# Patient Record
Sex: MALE | Race: WHITE | NOT HISPANIC OR LATINO | Employment: FULL TIME | URBAN - METROPOLITAN AREA
[De-identification: names, ages, dates, MRNs, and addresses within clinical notes are randomized per-mention and may not be internally consistent; named-entity substitution may affect disease eponyms.]

---

## 2018-10-29 ENCOUNTER — OFFICE VISIT (OUTPATIENT)
Dept: NEUROSURGERY | Facility: CLINIC | Age: 48
End: 2018-10-29

## 2018-10-29 VITALS
WEIGHT: 254 LBS | TEMPERATURE: 97.7 F | HEIGHT: 72 IN | SYSTOLIC BLOOD PRESSURE: 118 MMHG | DIASTOLIC BLOOD PRESSURE: 64 MMHG | BODY MASS INDEX: 34.4 KG/M2

## 2018-10-29 DIAGNOSIS — G56.21 ENTRAPMENT OF RIGHT ULNAR NERVE: ICD-10-CM

## 2018-10-29 DIAGNOSIS — M48.02 SPINAL STENOSIS IN CERVICAL REGION: Primary | ICD-10-CM

## 2018-10-29 DIAGNOSIS — M50.30 DDD (DEGENERATIVE DISC DISEASE), CERVICAL: ICD-10-CM

## 2018-10-29 DIAGNOSIS — G90.513 COMPLEX REGIONAL PAIN SYNDROME TYPE 1 OF BOTH UPPER EXTREMITIES: ICD-10-CM

## 2018-10-29 DIAGNOSIS — Z86.69 HISTORY OF CHIARI MALFORMATION: ICD-10-CM

## 2018-10-29 DIAGNOSIS — G56.01 RIGHT CARPAL TUNNEL SYNDROME: ICD-10-CM

## 2018-10-29 DIAGNOSIS — M96.1 CERVICAL POST-LAMINECTOMY SYNDROME: ICD-10-CM

## 2018-10-29 DIAGNOSIS — M79.672 FOOT PAIN, BILATERAL: ICD-10-CM

## 2018-10-29 DIAGNOSIS — M79.671 FOOT PAIN, BILATERAL: ICD-10-CM

## 2018-10-29 PROCEDURE — 99213 OFFICE O/P EST LOW 20 MIN: CPT | Performed by: PHYSICIAN ASSISTANT

## 2018-10-29 RX ORDER — GABAPENTIN 800 MG/1
800 TABLET ORAL
COMMUNITY
End: 2019-12-23

## 2018-10-29 RX ORDER — OXYCODONE AND ACETAMINOPHEN 10; 325 MG/1; MG/1
TABLET ORAL
COMMUNITY
End: 2019-12-23

## 2018-10-29 NOTE — PROGRESS NOTES
"Subjective     Chief Complaint:  Edil Ireland is a 48 y.o. male here today with complaints of bilateral arm pain that is new and worsening as well as bilateral foot pain.    History of Present Illness  Mr Ireland is a 49yo M with a complicated surgical history. He has a chiari malformation that is s/p remote craniovertebral decompression done by Dr. Romeo. He has had cervical laminectomy for stenosis, insertion and removal of spinal cord stimulator, and right ulnar nerve and median nerve decompression done by Dr. Rendon. He has bilateral complex regional pain syndrome in his arms and hands, with sensitivity to wind or clothing, with intermittent redness and sweating. This has been present for many years, but he notes that it has become much worse over the last few months.  He states that the \"stinging\" pain and sensitivity runs across his biceps diagonally, down the dorsal aspect of his forearm and into his hands. It is equally severe bilaterally and in the same distribution. At his last visit with Dr. Rendon in May of 2016, MRI of the cervical spine showed some moderate stenosis at C5/6 and C6/7, but EMG/NCV from 05/04/2016 showed  ulnar nerve entrapment at the elbow and median neuropathy at the wrist on the right, consistent with carpal tunnel syndrome. He underwent CTR and ulnar nerve release on the right on 8/5/16. The patient notes that this did not help at all and that he was no better and no worse afterward.     In addition to his arm and hand pain, he also notes bilateral foot pain and numbness of the big toes. He states that the bottom of his feet bilaterally have pain and feel \"like a stone bruise\". He was told that he has plantar fascitis, and he has switched shoes, but has had no relief yet. He denies thigh or calf pain, weakness, or gait disturbance. He denies bowel or bladder dysfunction. He also denies neck or back pain. He has not had lumbar surgery or injury in the past.  His pain is primarily " "arm/hand and feet. He has not had any new MRI since 2016. He states that he has had a lower extremity EMG done at his pain management doctor's, but does not have that report with him. He takes gabapentin 800mg TID and Percocet 10 QID at baseline for chronic pain, but does not feel that these doses are helping his current symptoms. He works full time as a manager, with a great deal of walking, typing, and writing.        The following portions of the patient's history were reviewed and updated as appropriate: allergies, current medications, past family history, past medical history, past social history, past surgical history and problem list.      Past Medical History:   Diagnosis Date   • Arm pain, left    • Full dentures    • Neuropathic pain    • Wears glasses      Past Surgical History:   Procedure Laterality Date   • CARPAL TUNNEL RELEASE Right 8/5/2016    Procedure: CARPAL TUNNEL RELEASE;  Surgeon: Benito Rendon MD;  Location:  [facility];  Service:    • NECK SURGERY     • SPINAL CORD STIMULATOR REMOVAL     • ULNAR NERVE DECOMPRESSION Right 8/5/2016    Procedure: ULNAR NERVE RELEASE AT ELBOW;  Surgeon: Benito Rendon MD;  Location:  [facility];  Service:      Family history:   Family History   Problem Relation Age of Onset   • Hypertension Father        Social history:   Social History     Social History   • Marital status:      Spouse name: N/A   • Number of children: N/A   • Years of education: N/A     Occupational History   • Not on file.     Social History Main Topics   • Smoking status: Current Every Day Smoker     Packs/day: 2.00     Years: 20.00   • Smokeless tobacco: Current User     Types: Chew      Comment: \"CHEW AT WORK ONLY WHEN I CAN'T SMOKE\"   • Alcohol use No   • Drug use: No   • Sexual activity: Not on file     Other Topics Concern   • Not on file     Social History Narrative   • No narrative on file       Review of Systems   Constitutional: Negative for diaphoresis, fatigue and fever. " "  Respiratory: Negative for shortness of breath.    Cardiovascular: Negative for chest pain and palpitations.   Gastrointestinal: Negative for abdominal pain, nausea and vomiting.   Musculoskeletal: Positive for back pain and neck pain.   Neurological: Positive for weakness. Negative for dizziness, syncope, light-headedness and headaches.   Psychiatric/Behavioral: Negative for confusion.   All other systems reviewed and are negative.      Objective   Blood pressure 118/64, temperature 97.7 °F (36.5 °C), temperature source Temporal Artery , height 182.9 cm (72\"), weight 115 kg (254 lb).  Body mass index is 34.45 kg/m².    Physical Exam   Constitutional: He is oriented to person, place, and time. He appears well-developed and well-nourished. No distress.   Mild obesity   HENT:   Right Ear: External ear normal.   Left Ear: External ear normal.   Posterior cervical and occipital incision, well-healed   Eyes: Pupils are equal, round, and reactive to light. EOM are normal.   Neck: Normal range of motion.   Pulmonary/Chest: Effort normal. No respiratory distress.   Musculoskeletal:   Gait is antalgic and favors the left leg. It is somewhat wide-based, but without spasticity or discoordination.    LE strength 5/5 and equal to direct testing.         Neurological: He is alert and oriented to person, place, and time. No cranial nerve deficit. Coordination normal.   Reflex Scores:       Tricep reflexes are 1+ on the right side and 1+ on the left side.       Bicep reflexes are 1+ on the right side and 1+ on the left side.       Brachioradialis reflexes are 0 on the right side and 0 on the left side.       Patellar reflexes are 2+ on the right side and 2+ on the left side.  No carson's or clonus noted.    Skin: He is not diaphoretic.       Assessment/Plan   Mr. Ireland has a history of cervical stenosis, per MRI from both 2015 and 2016. Dr. Rendon did not feel at the time that it was severe enough to warrant intervention. We will " order a new cervical MRI for review. If this shows no progression of stenosis, we may attempt conservative treatment with PT and pain management.  He will follow up once the MRI has been completed.     Edil was seen today for neck pain.    Diagnoses and all orders for this visit:    Spinal stenosis in cervical region  -     MRI Cervical Spine With & Without Contrast; Future    History of Chiari malformation    Cervical post-laminectomy syndrome  -     MRI Cervical Spine With & Without Contrast; Future    DDD (degenerative disc disease), cervical  -     MRI Cervical Spine With & Without Contrast; Future    Foot pain, bilateral    Complex regional pain syndrome type 1 of both upper extremities    Entrapment of right ulnar nerve    Right carpal tunnel syndrome        Return in about 2 weeks (around 11/12/2018).

## 2018-11-12 ENCOUNTER — HOSPITAL ENCOUNTER (OUTPATIENT)
Dept: MRI IMAGING | Facility: HOSPITAL | Age: 48
Discharge: HOME | End: 2018-11-12
Admitting: PHYSICIAN ASSISTANT

## 2018-11-12 ENCOUNTER — APPOINTMENT (OUTPATIENT)
Dept: MRI IMAGING | Facility: HOSPITAL | Age: 48
End: 2018-11-12

## 2018-11-12 ENCOUNTER — OFFICE VISIT (OUTPATIENT)
Dept: NEUROSURGERY | Facility: CLINIC | Age: 48
End: 2018-11-12

## 2018-11-12 VITALS
TEMPERATURE: 97.4 F | SYSTOLIC BLOOD PRESSURE: 138 MMHG | WEIGHT: 253 LBS | HEIGHT: 72 IN | BODY MASS INDEX: 34.27 KG/M2 | DIASTOLIC BLOOD PRESSURE: 90 MMHG

## 2018-11-12 DIAGNOSIS — M48.02 SPINAL STENOSIS IN CERVICAL REGION: ICD-10-CM

## 2018-11-12 DIAGNOSIS — G90.513 COMPLEX REGIONAL PAIN SYNDROME TYPE 1 OF BOTH UPPER EXTREMITIES: ICD-10-CM

## 2018-11-12 DIAGNOSIS — M96.1 CERVICAL POST-LAMINECTOMY SYNDROME: ICD-10-CM

## 2018-11-12 DIAGNOSIS — M50.30 DDD (DEGENERATIVE DISC DISEASE), CERVICAL: ICD-10-CM

## 2018-11-12 DIAGNOSIS — M96.1 CERVICAL POST-LAMINECTOMY SYNDROME: Primary | ICD-10-CM

## 2018-11-12 PROCEDURE — A9577 INJ MULTIHANCE: HCPCS | Performed by: PHYSICIAN ASSISTANT

## 2018-11-12 PROCEDURE — 0 GADOBENATE DIMEGLUMINE 529 MG/ML SOLUTION: Performed by: PHYSICIAN ASSISTANT

## 2018-11-12 PROCEDURE — 72156 MRI NECK SPINE W/O & W/DYE: CPT

## 2018-11-12 PROCEDURE — 99213 OFFICE O/P EST LOW 20 MIN: CPT | Performed by: NEUROLOGICAL SURGERY

## 2018-11-12 RX ADMIN — GADOBENATE DIMEGLUMINE 20 ML: 529 INJECTION, SOLUTION INTRAVENOUS at 08:58

## 2018-11-12 NOTE — PROGRESS NOTES
"Subjective     Chief Complaint: Left arm neuropathic pain    Patient ID: Edil Ireland is a 48 y.o. male is here today for follow-up.    History of Present Illness    This is a 48-year-old man with a complicated medical history.  He has undergone a Chiari decompression with Dr. Romeo at the [facility].  He has undergone a cervical laminectomy.  He had a spinal cord stimulator which was implanted and subsequently removed.  He has complex regional pain syndrome of his bilateral upper extremities.  He presents today to discuss the results of her recent MRI of the cervical spine that was performed for worsening neuropathic pain of his left upper extremity.    The following portions of the patient's history were reviewed and updated as appropriate: allergies, current medications, past family history, past medical history, past social history, past surgical history and problem list.    Family history:   Family History   Problem Relation Age of Onset   • Hypertension Father        Social history:   Social History     Socioeconomic History   • Marital status:      Spouse name: Not on file   • Number of children: Not on file   • Years of education: Not on file   • Highest education level: Not on file   Social Needs   • Financial resource strain: Not on file   • Food insecurity - worry: Not on file   • Food insecurity - inability: Not on file   • Transportation needs - medical: Not on file   • Transportation needs - non-medical: Not on file   Occupational History   • Not on file   Tobacco Use   • Smoking status: Current Every Day Smoker     Packs/day: 2.00     Years: 20.00     Pack years: 40.00   • Smokeless tobacco: Current User     Types: Chew   • Tobacco comment: \"CHEW AT WORK ONLY WHEN I CAN'T SMOKE\"   Substance and Sexual Activity   • Alcohol use: No   • Drug use: No   • Sexual activity: Not on file   Other Topics Concern   • Not on file   Social History Narrative   • Not on file       Review of " Systems   Constitutional: Negative for activity change, appetite change, chills, diaphoresis, fatigue, fever and unexpected weight change.   HENT: Negative for congestion, dental problem, drooling, ear discharge, ear pain, facial swelling, hearing loss, mouth sores, nosebleeds, postnasal drip, rhinorrhea, sinus pressure, sneezing, sore throat, tinnitus, trouble swallowing and voice change.    Eyes: Negative for photophobia, pain, discharge, redness, itching and visual disturbance.   Respiratory: Negative for apnea, cough, choking, chest tightness, shortness of breath, wheezing and stridor.    Cardiovascular: Negative for chest pain, palpitations and leg swelling.   Gastrointestinal: Negative for abdominal distention, abdominal pain, anal bleeding, blood in stool, constipation, diarrhea, nausea, rectal pain and vomiting.   Endocrine: Negative for cold intolerance, heat intolerance, polydipsia, polyphagia and polyuria.   Genitourinary: Negative for decreased urine volume, difficulty urinating, dysuria, enuresis, flank pain, frequency, genital sores, hematuria and urgency.   Musculoskeletal: Positive for back pain and neck pain. Negative for arthralgias, gait problem, joint swelling, myalgias and neck stiffness.   Skin: Negative for color change, pallor, rash and wound.   Allergic/Immunologic: Negative for environmental allergies, food allergies and immunocompromised state.   Neurological: Positive for weakness. Negative for dizziness, tremors, seizures, syncope, facial asymmetry, speech difficulty, light-headedness, numbness and headaches.   Hematological: Negative for adenopathy. Does not bruise/bleed easily.   Psychiatric/Behavioral: Negative for agitation, behavioral problems, confusion, decreased concentration, dysphoric mood, hallucinations, self-injury, sleep disturbance and suicidal ideas. The patient is not nervous/anxious and is not hyperactive.    All other systems reviewed and are negative.      Objective  "  Blood pressure 138/90, temperature 97.4 °F (36.3 °C), temperature source Temporal, height 182.9 cm (72.01\"), weight 115 kg (253 lb).  Body mass index is 34.31 kg/m².    Physical Exam   Constitutional: He is oriented to person, place, and time.   Chronically ill-appearing   HENT:   Head: Normocephalic.   Pulmonary/Chest: Effort normal.   Neurological: He is alert and oriented to person, place, and time.   Psychiatric: He has a normal mood and affect.         Assessment/Plan     Independent Review of Radiographic Studies:      Available for my review is a MRI of the cervical spine that was performed on 11/12/18.  This demonstrates a large herniated disc at C6 7 which is eccentric to the right side and probably contacting the exiting C7 nerve root on the right.  This is only slightly worsened in comparison to his MRI of the cervical spine from 2016.    Medical Decision Making:      This is a 48-year-old man with chief complaints of left upper extremity neuropathic pain.  He does not have a clear MRI correlate for his symptoms.  He is not a candidate for an ACDF.  He may possibly require some more exotic cervical spinal cord pain surgery.  I've referred him to the pain neurosurgeons at [place].  I be happy to follow up with him on an as-needed basis.  Unfortunately, I don't think I have anything else to offer this complicated patient.    Edil was seen today for neck pain.    Diagnoses and all orders for this visit:    Cervical post-laminectomy syndrome    Complex regional pain syndrome type 1 of both upper extremities        Return if symptoms worsen or fail to improve.           This document signed by AYSE Cristina MD November 12, 2018 10:48 AM      "

## 2019-03-01 ENCOUNTER — TELEPHONE (OUTPATIENT)
Dept: NEUROSURGERY | Facility: CLINIC | Age: 49
End: 2019-03-01

## 2019-03-01 DIAGNOSIS — G56.01 RIGHT CARPAL TUNNEL SYNDROME: ICD-10-CM

## 2019-03-01 DIAGNOSIS — M50.30 DDD (DEGENERATIVE DISC DISEASE), CERVICAL: ICD-10-CM

## 2019-03-01 DIAGNOSIS — Z86.69 HISTORY OF CHIARI MALFORMATION: Primary | ICD-10-CM

## 2019-03-01 DIAGNOSIS — G90.513 COMPLEX REGIONAL PAIN SYNDROME TYPE 1 OF BOTH UPPER EXTREMITIES: ICD-10-CM

## 2019-03-01 DIAGNOSIS — M48.02 SPINAL STENOSIS IN CERVICAL REGION: ICD-10-CM

## 2019-03-01 NOTE — TELEPHONE ENCOUNTER
Provider:  Jonnie  Caller: Edil    Phone #:  [phone]  Last visit:   11/12/18  Next visit: n/a    GRANT:         Reason for call:           Pt is requesting to move forward with referral to [place]. Please adv

## 2019-03-04 NOTE — TELEPHONE ENCOUNTER
REF pended.     Unable to select a provider from [facility],   Contact info   [facility]  [facility]  [address]  [address]  [place] [address]  Phone [phone]

## 2019-03-13 NOTE — TELEPHONE ENCOUNTER
Spoke w/ Nusrat Arce does not expect pt's insurance We will start a REF for [facility].

## 2019-12-23 ENCOUNTER — OFFICE VISIT (OUTPATIENT)
Dept: NEUROSURGERY | Facility: CLINIC | Age: 49
End: 2019-12-23

## 2019-12-23 VITALS — WEIGHT: 261 LBS | BODY MASS INDEX: 35.35 KG/M2 | HEIGHT: 72 IN

## 2019-12-23 DIAGNOSIS — G89.29 CHRONIC NECK PAIN: ICD-10-CM

## 2019-12-23 DIAGNOSIS — G90.513 COMPLEX REGIONAL PAIN SYNDROME TYPE 1 OF BOTH UPPER EXTREMITIES: ICD-10-CM

## 2019-12-23 DIAGNOSIS — M54.2 CHRONIC NECK PAIN: ICD-10-CM

## 2019-12-23 DIAGNOSIS — M50.30 DDD (DEGENERATIVE DISC DISEASE), CERVICAL: Primary | ICD-10-CM

## 2019-12-23 PROCEDURE — 99213 OFFICE O/P EST LOW 20 MIN: CPT | Performed by: NEUROLOGICAL SURGERY

## 2019-12-23 RX ORDER — PREGABALIN 200 MG/1
200 CAPSULE ORAL
COMMUNITY
Start: 2019-04-01

## 2019-12-23 RX ORDER — OXYCODONE AND ACETAMINOPHEN 10; 325 MG/1; MG/1
1 TABLET ORAL EVERY 6 HOURS PRN
COMMUNITY

## 2019-12-23 NOTE — PROGRESS NOTES
"Subjective     Chief Complaint: Follow-up left arm pain, post laminectomy complex regional pain syndrome    Patient ID: Edil Ireland is a 49 y.o. male is here today for follow-up.    History of Present Illness    This is a 49-year-old man who I seen in the past for severe left arm pain.  He carries a diagnosis of complex regional pain syndrome.  He has previously had a cervical spinal cord stimulator placed and subsequently removed for lack of efficacy.  He presents today to see if there are any new treatments which might offer him some relief from his debilitating left arm pain.    He currently takes Lyrica 200 mg twice daily and states that this does provide adequate pain relief.  He has requested with his pain management doctor to increase the dose of Lyrica and so far has not been able to get his doctor to do this for him.  I previously have referred him to [place] pain clinic, however his insurance would not cover this visit.  He then went to the [facility] and saw a general practice neurosurgeon who offered to do an ACDF.  The patient fortunately declined to undergo this unindicated surgical procedure.    The following portions of the patient's history were reviewed and updated as appropriate: allergies, current medications, past family history, past medical history, past social history, past surgical history and problem list.    Family history:   Family History   Problem Relation Age of Onset   • Hypertension Father    • Cancer Father        Social history:   Social History     Socioeconomic History   • Marital status:      Spouse name: Not on file   • Number of children: Not on file   • Years of education: Not on file   • Highest education level: Not on file   Tobacco Use   • Smoking status: Current Every Day Smoker     Packs/day: 2.00     Years: 20.00     Pack years: 40.00   • Smokeless tobacco: Current User     Types: Chew   • Tobacco comment: \"CHEW AT WORK ONLY WHEN I CAN'T SMOKE\" "   Substance and Sexual Activity   • Alcohol use: No   • Drug use: No       Review of Systems   Constitutional: Negative for activity change, appetite change, chills, diaphoresis, fatigue, fever and unexpected weight change.   HENT: Positive for congestion. Negative for dental problem, drooling, ear discharge, ear pain, facial swelling, hearing loss, mouth sores, nosebleeds, postnasal drip, rhinorrhea, sinus pressure, sinus pain, sneezing, sore throat, tinnitus, trouble swallowing and voice change.    Eyes: Negative for photophobia, pain, discharge, redness, itching and visual disturbance.   Respiratory: Negative for apnea, cough, choking, chest tightness, shortness of breath, wheezing and stridor.    Cardiovascular: Negative for chest pain, palpitations and leg swelling.   Gastrointestinal: Negative for abdominal distention, abdominal pain, anal bleeding, blood in stool, constipation, diarrhea, nausea, rectal pain and vomiting.   Endocrine: Positive for cold intolerance and heat intolerance. Negative for polydipsia, polyphagia and polyuria.   Genitourinary: Negative for decreased urine volume, difficulty urinating, discharge, dysuria, enuresis, flank pain, frequency, genital sores, hematuria, penile pain, penile swelling, scrotal swelling, testicular pain and urgency.   Musculoskeletal: Positive for arthralgias, joint swelling, myalgias, neck pain and neck stiffness. Negative for back pain and gait problem.   Skin: Positive for color change. Negative for pallor, rash and wound.   Allergic/Immunologic: Negative for environmental allergies, food allergies and immunocompromised state.   Neurological: Positive for numbness. Negative for dizziness, tremors, seizures, syncope, facial asymmetry, speech difficulty, weakness, light-headedness and headaches.   Hematological: Positive for adenopathy. Does not bruise/bleed easily.   Psychiatric/Behavioral: Positive for sleep disturbance. Negative for agitation, behavioral  "problems, confusion, decreased concentration, dysphoric mood, hallucinations, self-injury and suicidal ideas. The patient is nervous/anxious. The patient is not hyperactive.        Objective   Height 182.9 cm (72.01\"), weight 118 kg (261 lb).  Body mass index is 35.39 kg/m².    Physical Exam   Constitutional: He is oriented to person, place, and time. He appears well-developed and well-nourished. No distress.   HENT:   Head: Normocephalic and atraumatic.   Pulmonary/Chest: Effort normal.   Neurological: He is alert and oriented to person, place, and time.   Skin: Skin is warm and dry. He is not diaphoretic.   Psychiatric: He has a normal mood and affect. His behavior is normal.   Vitals reviewed.        Assessment/Plan     Independent Review of Radiographic Studies:      He has no new imaging for me to review    Medical Decision Making:      This is a 49-year-old man with classic findings of complex regional pain syndrome.  He is not a surgical candidate.  My recommendations would be to be evaluated by either a neurosurgical pain expert such as Dr. Ramachandran at [facility] or the [place] pain clinic.  He also could undergo reevaluation for a next generation spinal cord stimulator.  I have made a referral to pain management.  I also increase the patient's dose of Lyrica at his request.  He states that he has tried taking additional doses of Lyrica in the past and this does provide him additional pain relief.  I can follow-up with him on an as-needed basis.    Edil was seen today for neck pain and back pain.    Diagnoses and all orders for this visit:    DDD (degenerative disc disease), cervical  -     Ambulatory Referral to Pain Management    Chronic neck pain  -     Ambulatory Referral to Pain Management    Complex regional pain syndrome type 1 of both upper extremities        No follow-ups on file.           This document signed by AYSE Cristina MD December 23, 2019 1:14 PM      "

## 2024-09-25 ENCOUNTER — OFFICE VISIT (OUTPATIENT)
Dept: NEUROSURGERY | Facility: CLINIC | Age: 54
End: 2024-09-25
Payer: COMMERCIAL

## 2024-09-25 VITALS — TEMPERATURE: 97.8 F | WEIGHT: 225.8 LBS | HEIGHT: 72 IN | BODY MASS INDEX: 30.58 KG/M2

## 2024-09-25 DIAGNOSIS — G90.50 COMPLEX REGIONAL PAIN SYNDROME TYPE 1, AFFECTING UNSPECIFIED SITE: Primary | ICD-10-CM

## 2024-09-25 PROCEDURE — 99204 OFFICE O/P NEW MOD 45 MIN: CPT | Performed by: NEUROLOGICAL SURGERY

## 2024-09-25 RX ORDER — GLIPIZIDE 5 MG/1
TABLET ORAL
COMMUNITY

## 2024-09-25 RX ORDER — EMPAGLIFLOZIN 25 MG/1
TABLET, FILM COATED ORAL
COMMUNITY
Start: 2024-09-20

## 2024-09-25 RX ORDER — SEMAGLUTIDE 2.68 MG/ML
INJECTION, SOLUTION SUBCUTANEOUS
COMMUNITY
Start: 2024-09-11

## 2024-09-25 RX ORDER — LISINOPRIL 2.5 MG/1
TABLET ORAL
COMMUNITY

## 2024-09-25 RX ORDER — HYDROXYZINE HYDROCHLORIDE 25 MG/1
TABLET, FILM COATED ORAL
COMMUNITY
Start: 2024-06-19

## 2024-09-25 RX ORDER — PREGABALIN 300 MG/1
CAPSULE ORAL
COMMUNITY

## 2024-09-25 RX ORDER — CHOLECALCIFEROL (VITAMIN D3) 1250 MCG
CAPSULE ORAL
COMMUNITY
Start: 2024-06-19

## 2024-09-25 RX ORDER — ONDANSETRON 4 MG/1
TABLET, FILM COATED ORAL
COMMUNITY

## 2024-09-25 RX ORDER — HYDROCHLOROTHIAZIDE 25 MG/1
TABLET ORAL
COMMUNITY

## 2024-09-26 ENCOUNTER — TELEPHONE (OUTPATIENT)
Dept: NEUROSURGERY | Facility: CLINIC | Age: 54
End: 2024-09-26
Payer: COMMERCIAL